# Patient Record
Sex: FEMALE | Race: WHITE | NOT HISPANIC OR LATINO | Employment: OTHER | URBAN - METROPOLITAN AREA
[De-identification: names, ages, dates, MRNs, and addresses within clinical notes are randomized per-mention and may not be internally consistent; named-entity substitution may affect disease eponyms.]

---

## 2017-08-03 ENCOUNTER — TRANSCRIBE ORDERS (OUTPATIENT)
Dept: ADMINISTRATIVE | Facility: HOSPITAL | Age: 62
End: 2017-08-03

## 2017-08-03 DIAGNOSIS — Z12.31 VISIT FOR SCREENING MAMMOGRAM: Primary | ICD-10-CM

## 2020-10-05 RX ORDER — LEVOTHYROXINE SODIUM 0.07 MG/1
75 TABLET ORAL DAILY
COMMUNITY
End: 2020-10-05 | Stop reason: SDUPTHER

## 2020-10-05 RX ORDER — LEVOTHYROXINE SODIUM 0.07 MG/1
75 TABLET ORAL DAILY
Qty: 90 TABLET | Refills: 1 | Status: SHIPPED | OUTPATIENT
Start: 2020-10-05 | End: 2021-03-18

## 2020-10-06 ENCOUNTER — TELEPHONE (OUTPATIENT)
Dept: ENDOCRINOLOGY | Facility: CLINIC | Age: 65
End: 2020-10-06

## 2020-10-06 NOTE — TELEPHONE ENCOUNTER
PATIENT CALLED TO REQUEST 1 YEAR FOLLOW UP APPT WITH DR LAWLER. SHE STATES THAT SHE IS UNSURE WHEN HER LAST APPT WAS AND WOULD LIKE TO HAVE SOMEONE LOOK BACK IN THOSE RECORDS TO SEE WHEN SHE NEEDS TO COME IN. SHE STATES THAT HER INSURANCE PROVIDER IS [employer] AND HER APPTS HAVE TO BE SPACED AT LEAST ONE YEAR AND ONE DAY APART TO BE COVERED.

## 2020-10-12 NOTE — TELEPHONE ENCOUNTER
After looking in [employer] it looks like her last visit was 07/15/19. With a follow up needed in 1 year. Please advise on scheduling.

## 2020-12-09 ENCOUNTER — LAB (OUTPATIENT)
Dept: LAB | Facility: HOSPITAL | Age: 65
End: 2020-12-09

## 2020-12-09 ENCOUNTER — OFFICE VISIT (OUTPATIENT)
Dept: ENDOCRINOLOGY | Facility: CLINIC | Age: 65
End: 2020-12-09

## 2020-12-09 VITALS
SYSTOLIC BLOOD PRESSURE: 106 MMHG | BODY MASS INDEX: 40.59 KG/M2 | DIASTOLIC BLOOD PRESSURE: 74 MMHG | TEMPERATURE: 97.1 F | HEART RATE: 80 BPM | HEIGHT: 68 IN | WEIGHT: 267.8 LBS

## 2020-12-09 DIAGNOSIS — E04.2 MULTINODULAR THYROID: Primary | ICD-10-CM

## 2020-12-09 DIAGNOSIS — E04.2 MULTINODULAR THYROID: ICD-10-CM

## 2020-12-09 PROCEDURE — 99213 OFFICE O/P EST LOW 20 MIN: CPT | Performed by: INTERNAL MEDICINE

## 2020-12-09 PROCEDURE — 84443 ASSAY THYROID STIM HORMONE: CPT

## 2020-12-09 PROCEDURE — 76536 US EXAM OF HEAD AND NECK: CPT | Performed by: INTERNAL MEDICINE

## 2020-12-09 RX ORDER — KETOCONAZOLE 20 MG/ML
SHAMPOO TOPICAL
COMMUNITY

## 2020-12-09 RX ORDER — BUPROPION HYDROCHLORIDE 200 MG/1
TABLET, EXTENDED RELEASE ORAL
COMMUNITY
Start: 2020-09-22

## 2020-12-09 RX ORDER — METOPROLOL TARTRATE 50 MG/1
TABLET, FILM COATED ORAL
COMMUNITY
Start: 2020-10-22

## 2020-12-09 RX ORDER — QUETIAPINE FUMARATE 400 MG/1
TABLET, FILM COATED ORAL
COMMUNITY
Start: 2020-09-22 | End: 2023-01-11

## 2020-12-09 RX ORDER — SERTRALINE HYDROCHLORIDE 100 MG/1
TABLET, FILM COATED ORAL
COMMUNITY
Start: 2020-11-03

## 2020-12-09 RX ORDER — DIAZEPAM 10 MG/1
TABLET ORAL EVERY 8 HOURS SCHEDULED
COMMUNITY

## 2020-12-09 RX ORDER — POTASSIUM CHLORIDE 1.5 G/1.77G
POWDER, FOR SOLUTION ORAL
COMMUNITY

## 2020-12-09 RX ORDER — TELMISARTAN 40 MG/1
TABLET ORAL
COMMUNITY
Start: 2020-11-18

## 2020-12-09 RX ORDER — IBUPROFEN 600 MG/1
TABLET ORAL
COMMUNITY
End: 2023-01-11

## 2020-12-09 RX ORDER — POTASSIUM CHLORIDE 1500 MG/1
TABLET, FILM COATED, EXTENDED RELEASE ORAL
COMMUNITY
Start: 2020-10-29

## 2020-12-09 RX ORDER — NYSTATIN 100000 [USP'U]/G
POWDER TOPICAL
COMMUNITY

## 2020-12-09 RX ORDER — SIMVASTATIN 40 MG
TABLET ORAL
COMMUNITY
Start: 2020-10-29

## 2020-12-09 NOTE — PROGRESS NOTES
"     Office Note      Date: 2020  Patient Name: Juliette Zepeda  MRN: [id number]  : 1955    Chief Complaint   Patient presents with   • Thyroid Problem       History of Present Illness:   Juliette Zepeda is a 64 y.o. female who presents for Thyroid Problem    She remains on T4 75mcg qd. She is taking this correctly. She isn't taking any interfering  meds concurrently. She hasn't noted any change in the size of her neck. She denies any  compressive sxs. She denies any sxs of hypo- or hyperthyroidism at this time.    She had neck u/s last  and the largest nodule in the right lobe had increased in size from 2017.    Benign thyroid FNA .    Subjective      Review of Systems:   Review of Systems   Constitutional: Negative.    Cardiovascular: Negative.    Gastrointestinal: Negative.    Endocrine: Negative.        The following portions of the patient's history were reviewed and updated as appropriate: allergies, current medications, past family history, past medical history, past social history, past surgical history and problem list.    Objective     Visit Vitals  /74 (BP Location: Left arm, Patient Position: Sitting, Cuff Size: Adult)   Pulse 80   Temp 97.1 °F (36.2 °C) (Infrared)   Ht 172.7 cm (68\")   Wt 121 kg (267 lb 12.8 oz)   BMI 40.72 kg/m²       Physical Exam:  Physical Exam  Constitutional:       Appearance: Normal appearance.   Neurological:      Mental Status: She is alert.         Labs:    TSH  No results found for: TSHBASE     Free T4  No results found for: FREET4    T3  No results found for: [id number]      TPO  No results found for: THYROIDAB    TG AB  No results found for: THGAB    TG  No results found for: THYROGLB    CBC w/DIFF  No results found for: WBC, RBC, HGB, HCT, MCV, MCH, MCHC, RDW, RDWSD, MPV, PLT, NEUTRORELPCT, LYMPHORELPCT, MONORELPCT, EOSRELPCT, BASORELPCT, AUTOIGPER, NEUTROABS, LYMPHSABS, MONOSABS, EOSABS, BASOSABS, AUTOIGNUM, NRBC        Assessment / Plan  "     Assessment & Plan:  Problem List Items Addressed This Visit        Endocrine    Multinodular thyroid - Primary    Current Assessment & Plan     Check TSH today.    A neck u/s was performed today.  This revealed multiple bilateral thyroid nodules.  The largest was posteriorly located in the right lobe.  It measured 2.3cm which is stable in size compared to u/s from 7/15/2019.    Plan for another u/s in 2 years.           Relevant Medications    levothyroxine (SYNTHROID, LEVOTHROID) 75 MCG tablet    metoprolol tartrate (LOPRESSOR) 50 MG tablet    Other Relevant Orders    TSH    US Thyroid (Completed)           Return in about 1 year (around 12/9/2021) for Recheck with TSH.    Say Nj MD   12/09/2020

## 2020-12-09 NOTE — ASSESSMENT & PLAN NOTE
Check TSH today.    A neck u/s was performed today.  This revealed multiple bilateral thyroid nodules.  The largest was posteriorly located in the right lobe.  It measured 2.3cm which is stable in size compared to u/s from 7/15/2019.    Plan for another u/s in 2 years.

## 2020-12-10 LAB — TSH SERPL DL<=0.05 MIU/L-ACNC: 2 UIU/ML (ref 0.27–4.2)

## 2021-03-18 RX ORDER — LEVOTHYROXINE SODIUM 0.07 MG/1
TABLET ORAL
Qty: 90 TABLET | Refills: 0 | Status: SHIPPED | OUTPATIENT
Start: 2021-03-18 | End: 2021-06-21

## 2021-06-21 RX ORDER — LEVOTHYROXINE SODIUM 0.07 MG/1
TABLET ORAL
Qty: 90 TABLET | Refills: 1 | Status: SHIPPED | OUTPATIENT
Start: 2021-06-21 | End: 2021-11-08

## 2021-11-08 RX ORDER — LEVOTHYROXINE SODIUM 0.07 MG/1
75 TABLET ORAL DAILY
Qty: 90 TABLET | Refills: 0 | Status: SHIPPED | OUTPATIENT
Start: 2021-11-08 | End: 2022-03-09

## 2022-03-09 RX ORDER — LEVOTHYROXINE SODIUM 0.07 MG/1
TABLET ORAL
Qty: 90 TABLET | Refills: 1 | Status: SHIPPED | OUTPATIENT
Start: 2022-03-09 | End: 2023-01-11 | Stop reason: SDUPTHER

## 2022-08-18 RX ORDER — LEVOTHYROXINE SODIUM 0.07 MG/1
TABLET ORAL
Qty: 90 TABLET | Refills: 1 | OUTPATIENT
Start: 2022-08-18

## 2022-08-25 RX ORDER — LEVOTHYROXINE SODIUM 0.07 MG/1
TABLET ORAL
Qty: 90 TABLET | Refills: 1 | OUTPATIENT
Start: 2022-08-25

## 2023-01-11 ENCOUNTER — OFFICE VISIT (OUTPATIENT)
Dept: ENDOCRINOLOGY | Facility: CLINIC | Age: 68
End: 2023-01-11
Payer: MEDICARE

## 2023-01-11 VITALS
DIASTOLIC BLOOD PRESSURE: 82 MMHG | HEART RATE: 59 BPM | BODY MASS INDEX: 36.98 KG/M2 | OXYGEN SATURATION: 98 % | WEIGHT: 244 LBS | SYSTOLIC BLOOD PRESSURE: 126 MMHG | HEIGHT: 68 IN

## 2023-01-11 DIAGNOSIS — E04.2 MULTINODULAR THYROID: Primary | ICD-10-CM

## 2023-01-11 PROCEDURE — 84443 ASSAY THYROID STIM HORMONE: CPT | Performed by: INTERNAL MEDICINE

## 2023-01-11 PROCEDURE — 36415 COLL VENOUS BLD VENIPUNCTURE: CPT | Performed by: INTERNAL MEDICINE

## 2023-01-11 PROCEDURE — 99213 OFFICE O/P EST LOW 20 MIN: CPT | Performed by: INTERNAL MEDICINE

## 2023-01-11 PROCEDURE — 76536 US EXAM OF HEAD AND NECK: CPT | Performed by: INTERNAL MEDICINE

## 2023-01-11 RX ORDER — LEVOTHYROXINE SODIUM 0.07 MG/1
75 TABLET ORAL DAILY
Qty: 90 TABLET | Refills: 3 | Status: SHIPPED | OUTPATIENT
Start: 2023-01-11

## 2023-01-11 RX ORDER — POTASSIUM CHLORIDE 20 MEQ/1
TABLET, EXTENDED RELEASE ORAL
COMMUNITY

## 2023-01-11 RX ORDER — MONTELUKAST SODIUM 4 MG/1
TABLET, CHEWABLE ORAL
COMMUNITY

## 2023-01-11 NOTE — ASSESSMENT & PLAN NOTE
Continue T4 tx.  Check TSH today.    A neck u/s was performed today.  This revealed fairly normal size thyroid gland with numerous small nodules and colloid cysts.  The largest nodule was solid and mixed density located posteriorly in the right lobe.  It measured 2.6cm in largest dimension.  No increased blood flow was sign.  No calcifications.  Lymph nodes appeared normal in the neck.  This appears stable compared to u/s from 12/2020.    Plan for another u/s in 2 years.

## 2023-01-11 NOTE — PROGRESS NOTES
"     Office Note      Date: 2023  Patient Name: Juliette Zepeda  MRN: [id number]  : 1955    Chief Complaint   Patient presents with   • Follow-up     Multinodular thyroid       History of Present Illness:   Juliette Zepeda is a 67 y.o. female who presents for Follow-up (Multinodular thyroid)    She remains on T4 75mcg qd. She is taking this correctly. She isn't taking any interfering meds concurrently. She hasn't noted any change in the size of her neck. She denies any compressive sxs. She denies any sxs of hypo- or hyperthyroidism at this time.     She had neck u/s 2020 and the largest nodule in the right lobe was stable in size.     Benign thyroid FNA .    Subjective      Review of Systems:   Review of Systems   Constitutional: Negative.    Cardiovascular: Negative.    Gastrointestinal: Negative.    Endocrine: Negative.        The following portions of the patient's history were reviewed and updated as appropriate: allergies, current medications, past family history, past medical history, past social history, past surgical history and problem list.    Objective     Visit Vitals  /82 (BP Location: Left arm, Patient Position: Sitting, Cuff Size: Adult)   Pulse 59   Ht 172.7 cm (67.99\")   Wt 111 kg (244 lb)   SpO2 98%   BMI 37.11 kg/m²       Physical Exam:  Physical Exam  Constitutional:       Appearance: Normal appearance.   Neurological:      Mental Status: She is alert.         Labs:    TSH  No results found for: TSHBASE     Free T4  No results found for: FREET4    T3  No results found for: [id number]      TPO  No results found for: THYROIDAB    TG AB  No results found for: THGAB    TG  No results found for: THYROGLB    CBC w/DIFF  No results found for: WBC, RBC, HGB, HCT, MCV, MCH, MCHC, RDW, RDWSD, MPV, PLT, NEUTRORELPCT, LYMPHORELPCT, MONORELPCT, EOSRELPCT, BASORELPCT, AUTOIGPER, NEUTROABS, LYMPHSABS, MONOSABS, EOSABS, BASOSABS, AUTOIGNUM, NRBC        Assessment / Plan      Assessment & " Plan:  Diagnoses and all orders for this visit:    1. Multinodular thyroid (Primary)  Assessment & Plan:  Continue T4 tx.  Check TSH today.    A neck u/s was performed today.  This revealed fairly normal size thyroid gland with numerous small nodules and colloid cysts.  The largest nodule was solid and mixed density located posteriorly in the right lobe.  It measured 2.6cm in largest dimension.  No increased blood flow was sign.  No calcifications.  Lymph nodes appeared normal in the neck.  This appears stable compared to u/s from 12/2020.    Plan for another u/s in 2 years.    Orders:  -     TSH; Future  -     US Thyroid    Other orders  -     levothyroxine (SYNTHROID, LEVOTHROID) 75 MCG tablet; Take 1 tablet by mouth Daily.  Dispense: 90 tablet; Refill: 3    Current Outpatient Medications   Medication Instructions   • ASPIRIN 81 PO daily   • buPROPion SR (WELLBUTRIN SR) 200 MG 12 hr tablet 2 tabs twice daily   • Calcium-Vitamin D 500-125 MG-UNIT tablet Calcium-Vitamin D   Daily   • colestipol (COLESTID) 1 g tablet colestipol 1 gram tablet   1 tab PO up to TID as needed for diarrhea   • diazePAM (VALIUM) 10 MG tablet Every 8 Hours Scheduled, 1 tab in am, 1/2 tab at lunch, 1 tab qhs   • ketoconazole (NIZORAL) 2 % shampoo Nizoral 2 % shampoo   use Twice weekly as directed   • levothyroxine (SYNTHROID, LEVOTHROID) 75 mcg, Oral, Daily   • metoprolol tartrate (LOPRESSOR) 50 MG tablet No dose, route, or frequency recorded.   • nystatin (MYCOSTATIN) 973839 UNIT/GM powder nystatin 100,000 unit/gram topical powder   APPLY TO THE AFFECTED AREA(S) BY TOPICAL ROUTE 2 TIMES PER DAY   • potassium chloride (K-DUR,KLOR-CON) 20 MEQ CR tablet potassium chloride ER 20 mEq tablet,extended release   Take one tablet by mouth once daily   • potassium chloride (KLOR-CON) 20 MEQ packet potassium chloride ER 20 mEq tablet,extended release   • potassium chloride ER (K-TAB) 20 MEQ tablet controlled-release ER tablet No dose, route, or  frequency recorded.   • sertraline (ZOLOFT) 100 MG tablet daily   • simvastatin (ZOCOR) 40 MG tablet No dose, route, or frequency recorded.   • telmisartan (MICARDIS) 40 MG tablet No dose, route, or frequency recorded.      Return in about 1 year (around 1/11/2024) for Recheck with TSH.    Say Nj MD   01/11/2023

## 2023-01-12 LAB — TSH SERPL DL<=0.05 MIU/L-ACNC: 1.33 UIU/ML (ref 0.27–4.2)

## 2024-02-15 RX ORDER — LEVOTHYROXINE SODIUM 0.07 MG/1
75 TABLET ORAL DAILY
Qty: 90 TABLET | Refills: 3 | Status: SHIPPED | OUTPATIENT
Start: 2024-02-15

## 2024-09-11 ENCOUNTER — OFFICE VISIT (OUTPATIENT)
Dept: ENDOCRINOLOGY | Facility: CLINIC | Age: 69
End: 2024-09-11
Payer: MEDICARE

## 2024-09-11 VITALS
SYSTOLIC BLOOD PRESSURE: 122 MMHG | HEART RATE: 52 BPM | BODY MASS INDEX: 36.53 KG/M2 | DIASTOLIC BLOOD PRESSURE: 82 MMHG | HEIGHT: 68 IN | WEIGHT: 241 LBS | OXYGEN SATURATION: 98 %

## 2024-09-11 DIAGNOSIS — E04.2 MULTINODULAR THYROID: Primary | ICD-10-CM

## 2024-09-11 LAB — TSH SERPL DL<=0.05 MIU/L-ACNC: 3.04 UIU/ML (ref 0.27–4.2)

## 2024-09-11 PROCEDURE — 99213 OFFICE O/P EST LOW 20 MIN: CPT | Performed by: INTERNAL MEDICINE

## 2024-09-11 PROCEDURE — 76536 US EXAM OF HEAD AND NECK: CPT | Performed by: INTERNAL MEDICINE

## 2024-09-11 PROCEDURE — 36415 COLL VENOUS BLD VENIPUNCTURE: CPT | Performed by: INTERNAL MEDICINE

## 2024-09-11 PROCEDURE — 84443 ASSAY THYROID STIM HORMONE: CPT | Performed by: INTERNAL MEDICINE

## 2024-09-11 PROCEDURE — 1159F MED LIST DOCD IN RCRD: CPT | Performed by: INTERNAL MEDICINE

## 2024-09-11 PROCEDURE — 1160F RVW MEDS BY RX/DR IN RCRD: CPT | Performed by: INTERNAL MEDICINE

## 2024-09-11 RX ORDER — DAPAGLIFLOZIN 10 MG/1
10 TABLET, FILM COATED ORAL DAILY
COMMUNITY
Start: 2024-07-22

## 2024-09-11 NOTE — PROGRESS NOTES
"     Office Note      Date: 2024  Patient Name: Juliette Zepeda  MRN: [id number]  : 1955    Chief Complaint   Patient presents with    Thyroid Problem     Multinodular thyroid         History of Present Illness:   Juliette Zepeda is a 68 y.o. female who presents for Thyroid Problem (Multinodular thyroid/)    She remains on T4 75mcg qd. She is taking this correctly. She isn't taking any interfering meds concurrently. She hasn't noted any change in the size of her neck. She denies any compressive sxs aside from some pill dysphagia. She denies any sxs of hypo- or hyperthyroidism at this time.     She had neck u/s 2023 and the largest nodule in the right lobe was stable in size.      Benign thyroid FNA .    Subjective      Review of Systems:   Review of Systems   Constitutional: Negative.    Cardiovascular: Negative.    Gastrointestinal: Negative.    Endocrine: Negative.        The following portions of the patient's history were reviewed and updated as appropriate: allergies, current medications, past family history, past medical history, past social history, past surgical history, and problem list.    Objective     Visit Vitals  /82 (BP Location: Right arm, Patient Position: Sitting, Cuff Size: Adult)   Pulse 52   Ht 172.7 cm (68\")   Wt 109 kg (241 lb)   SpO2 98%   BMI 36.64 kg/m²       Physical Exam:  Physical Exam  Constitutional:       Appearance: Normal appearance.   Neck:      Thyroid: Thyroid mass present. No thyroid tenderness.      Comments: Right thyroid lobe mildly enlarged with subtle 2.5cm nodule - freely movable  Lymphadenopathy:      Cervical: No cervical adenopathy.   Neurological:      Mental Status: She is alert.         Labs:    TSH  No results found for: \"TSHBASE\"     Free T4  No results found for: \"FREET4\"    T3  No results found for: \"D1BERSG\"      TPO  No results found for: \"THYROIDAB\"    TG AB  No results found for: \"THGAB\"    TG  No results found for: \"THYROGLB\"    CBC " "w/DIFF  No results found for: \"WBC\", \"RBC\", \"HGB\", \"HCT\", \"MCV\", \"MCH\", \"MCHC\", \"RDW\", \"RDWSD\", \"MPV\", \"PLT\", \"NEUTRORELPCT\", \"LYMPHORELPCT\", \"MONORELPCT\", \"EOSRELPCT\", \"BASORELPCT\", \"AUTOIGPER\", \"NEUTROABS\", \"LYMPHSABS\", \"MONOSABS\", \"EOSABS\", \"BASOSABS\", \"AUTOIGNUM\", \"NRBC\"        Assessment / Plan      Assessment & Plan:  Diagnoses and all orders for this visit:    1. Multinodular thyroid (Primary)  Assessment & Plan:  Continue T4 tx.  Check TSH.    Neck exam is stable.    A neck u/s was performed today.  This revealed a dominant nodule in the right lobe.  It measured 2.9cm.  The overall size of the thyroid was enlarged.  It was quite heterogeneous with numerous bilateral cysts and small nodules.  This appears stable compared to u/s from 1/2023.    Plan for another u/s in 2 years.      Orders:  -     TSH; Future  -     US Thyroid      Current Outpatient Medications   Medication Instructions    buPROPion SR (WELLBUTRIN SR) 200 MG 12 hr tablet 2 tabs twice daily    Calcium-Vitamin D 500-125 MG-UNIT tablet Calcium-Vitamin D   Daily    dapagliflozin Propanediol 10 mg, Oral, Daily    diazePAM (VALIUM) 10 MG tablet Every 8 Hours Scheduled, 1 tab in am, 1/2 tab at lunch, 1 tab qhs    ketoconazole (NIZORAL) 2 % shampoo Nizoral 2 % shampoo   use Twice weekly as directed    levothyroxine (SYNTHROID, LEVOTHROID) 75 mcg, Oral, Daily    metoprolol tartrate (LOPRESSOR) 50 MG tablet No dose, route, or frequency recorded.    rivaroxaban (XARELTO) 10 mg, Oral    sertraline (ZOLOFT) 100 MG tablet daily    simvastatin (ZOCOR) 40 MG tablet No dose, route, or frequency recorded.    telmisartan (MICARDIS) 40 MG tablet No dose, route, or frequency recorded.      Return in about 6 months (around 3/11/2025) for Recheck with TSH, neck u/s.    Electronically signed by: Say Nj MD  09/11/2024  "

## 2024-09-11 NOTE — ASSESSMENT & PLAN NOTE
Continue T4 tx.  Check TSH.    Neck exam is stable.    A neck u/s was performed today.  This revealed a dominant nodule in the right lobe.  It measured 2.9cm.  The overall size of the thyroid was enlarged.  It was quite heterogeneous with numerous bilateral cysts and small nodules.  This appears stable compared to u/s from 1/2023.    Plan for another u/s in 2 years.

## 2025-02-05 RX ORDER — LEVOTHYROXINE SODIUM 75 UG/1
75 TABLET ORAL DAILY
Qty: 90 TABLET | Refills: 0 | Status: SHIPPED | OUTPATIENT
Start: 2025-02-05

## 2025-02-05 NOTE — TELEPHONE ENCOUNTER
Rx Refill Note  Requested Prescriptions     Pending Prescriptions Disp Refills    levothyroxine (SYNTHROID, LEVOTHROID) 75 MCG tablet [Pharmacy Med Name: LEVOTHYROXINE 75 MCG TABLET] 90 tablet 0     Sig: TAKE 1 TABLET BY MOUTH DAILY      Last office visit with prescribing clinician: 9/11/2024   Last telemedicine visit with prescribing clinician: Visit date not found   Next office visit with prescribing clinician: 3/25/2025                         Would you like a call back once the refill request has been completed: [] Yes [] No    If the office needs to give you a call back, can they leave a voicemail: [] Yes [] No    Joy Garza MA  02/05/25, 07:44 EST

## 2025-03-25 ENCOUNTER — OFFICE VISIT (OUTPATIENT)
Dept: ENDOCRINOLOGY | Facility: CLINIC | Age: 70
End: 2025-03-25
Payer: MEDICARE

## 2025-03-25 ENCOUNTER — RESULTS FOLLOW-UP (OUTPATIENT)
Dept: ENDOCRINOLOGY | Facility: CLINIC | Age: 70
End: 2025-03-25

## 2025-03-25 VITALS
WEIGHT: 250.2 LBS | BODY MASS INDEX: 37.92 KG/M2 | SYSTOLIC BLOOD PRESSURE: 124 MMHG | DIASTOLIC BLOOD PRESSURE: 82 MMHG | HEART RATE: 51 BPM | OXYGEN SATURATION: 99 % | HEIGHT: 68 IN

## 2025-03-25 DIAGNOSIS — E04.2 MULTINODULAR THYROID: Primary | ICD-10-CM

## 2025-03-25 LAB — TSH SERPL DL<=0.05 MIU/L-ACNC: 1.43 UIU/ML (ref 0.27–4.2)

## 2025-03-25 PROCEDURE — 84443 ASSAY THYROID STIM HORMONE: CPT | Performed by: INTERNAL MEDICINE

## 2025-03-25 PROCEDURE — 1160F RVW MEDS BY RX/DR IN RCRD: CPT | Performed by: INTERNAL MEDICINE

## 2025-03-25 PROCEDURE — 1159F MED LIST DOCD IN RCRD: CPT | Performed by: INTERNAL MEDICINE

## 2025-03-25 PROCEDURE — 99213 OFFICE O/P EST LOW 20 MIN: CPT | Performed by: INTERNAL MEDICINE

## 2025-03-25 PROCEDURE — 36415 COLL VENOUS BLD VENIPUNCTURE: CPT | Performed by: INTERNAL MEDICINE

## 2025-03-25 RX ORDER — LEVOTHYROXINE SODIUM 75 UG/1
75 TABLET ORAL DAILY
Qty: 90 TABLET | Refills: 3 | Status: SHIPPED | OUTPATIENT
Start: 2025-03-25

## 2025-03-25 NOTE — PROGRESS NOTES
"     Office Note      Date: 2025  Patient Name: Juliette Zepeda  MRN: [id number]  : 1955    Chief Complaint   Patient presents with    Thyroid Problem     Multinodular Thyroid   Next thyroid U/S planned for around 2026       History of Present Illness:   Juliette Zepeda is a 69 y.o. female who presents for Thyroid Problem (Multinodular Thyroid /Next thyroid U/S planned for around 2026)    She remains on T4 75mcg qd. She is taking this correctly. She isn't taking any interfering meds concurrently. She hasn't noted any change in the size of her neck. She denies any compressive sxs aside from some pill dysphagia. She denies any sxs of hypo- or hyperthyroidism at this time.     She had neck u/s 2023 and in 2024 and the largest nodule in the right lobe was stable in size.      Benign thyroid FNA .    Subjective      Review of Systems:   Review of Systems   Constitutional: Negative.    Cardiovascular: Negative.    Gastrointestinal: Negative.    Endocrine: Negative.        The following portions of the patient's history were reviewed and updated as appropriate: allergies, current medications, past family history, past medical history, past social history, past surgical history, and problem list.    Objective     Visit Vitals  /82 (BP Location: Left arm, Patient Position: Sitting, Cuff Size: Adult)   Pulse 51   Ht 172.7 cm (67.99\")   Wt 113 kg (250 lb 3.2 oz)   SpO2 99%   BMI 38.05 kg/m²       Physical Exam:  Physical Exam  Constitutional:       Appearance: She is obese.   Neck:      Thyroid: Thyroid mass and thyromegaly present. No thyroid tenderness.      Comments: Right thyroid enlarged and firm with ~3cm nodule - freely movable.  Left lobe mildly enlarged.  Lymphadenopathy:      Cervical: No cervical adenopathy.   Neurological:      Mental Status: She is alert.         Labs:    TSH  No results found for: \"TSHBASE\"     Free T4  No results found for: \"FREET4\"    T3  No results found " "for: \"[id number]\"      TPO  No results found for: \"THYROIDAB\"    TG AB  No results found for: \"THGAB\"    TG  No results found for: \"THYROGLB\"    CBC w/DIFF  No results found for: \"WBC\", \"RBC\", \"HGB\", \"HCT\", \"MCV\", \"MCH\", \"MCHC\", \"RDW\", \"RDWSD\", \"MPV\", \"PLT\", \"NEUTRORELPCT\", \"LYMPHORELPCT\", \"MONORELPCT\", \"EOSRELPCT\", \"BASORELPCT\", \"AUTOIGPER\", \"NEUTROABS\", \"LYMPHSABS\", \"MONOSABS\", \"EOSABS\", \"BASOSABS\", \"AUTOIGNUM\", \"NRBC\"        Assessment / Plan      Assessment & Plan:  Diagnoses and all orders for this visit:    1. Multinodular thyroid (Primary)  Assessment & Plan:  Continue levothyroxine.  Check TSH.    Neck exam is stable.  Plan for another u/s in about 18 months    Orders:  -     TSH; Future    Other orders  -     levothyroxine (SYNTHROID, LEVOTHROID) 75 MCG tablet; Take 1 tablet by mouth Daily.  Dispense: 90 tablet; Refill: 3      Current Outpatient Medications   Medication Instructions    buPROPion SR (WELLBUTRIN SR) 200 MG 12 hr tablet 2 tabs twice daily    Calcium-Vitamin D 500-125 MG-UNIT tablet Take 50,000 Units by mouth 1 (One) Time Per Week.    dapagliflozin Propanediol 10 mg, Daily    diazePAM (VALIUM) 10 MG tablet Every 8 Hours Scheduled    levothyroxine (SYNTHROID, LEVOTHROID) 75 mcg, Oral, Daily    metoprolol tartrate (LOPRESSOR) 50 MG tablet No dose, route, or frequency recorded.    rivaroxaban (XARELTO) 10 mg    sertraline (ZOLOFT) 100 MG tablet daily    simvastatin (ZOCOR) 40 MG tablet No dose, route, or frequency recorded.    telmisartan (MICARDIS) 40 MG tablet No dose, route, or frequency recorded.      Return in about 6 months (around 9/25/2025) for Recheck with TSH.    Electronically signed by: Say Nj MD  03/25/2025  "

## 2025-03-25 NOTE — ASSESSMENT & PLAN NOTE
Continue levothyroxine.  Check TSH.    Neck exam is stable.  Plan for another u/s in about 18 months